# Patient Record
Sex: MALE | Race: WHITE | HISPANIC OR LATINO | Employment: PART TIME | URBAN - METROPOLITAN AREA
[De-identification: names, ages, dates, MRNs, and addresses within clinical notes are randomized per-mention and may not be internally consistent; named-entity substitution may affect disease eponyms.]

---

## 2019-06-22 ENCOUNTER — HOSPITAL ENCOUNTER (EMERGENCY)
Facility: HOSPITAL | Age: 44
Discharge: HOME | End: 2019-06-23
Attending: FAMILY MEDICINE

## 2019-06-22 VITALS
RESPIRATION RATE: 20 BRPM | BODY MASS INDEX: 31.6 KG/M2 | DIASTOLIC BLOOD PRESSURE: 77 MMHG | HEART RATE: 62 BPM | TEMPERATURE: 99 F | WEIGHT: 171.75 LBS | OXYGEN SATURATION: 98 % | SYSTOLIC BLOOD PRESSURE: 143 MMHG | HEIGHT: 62 IN

## 2019-06-22 DIAGNOSIS — S61.327A: ICD-10-CM

## 2019-06-22 DIAGNOSIS — R03.0 ELEVATED BLOOD PRESSURE READING WITHOUT DIAGNOSIS OF HYPERTENSION: Primary | ICD-10-CM

## 2019-06-22 PROCEDURE — 96372 THER/PROPH/DIAG INJ SC/IM: CPT

## 2019-06-22 PROCEDURE — 11760 REPAIR OF NAIL BED: CPT

## 2019-06-22 PROCEDURE — 63600175 PHARM REV CODE 636 W HCPCS: Performed by: NURSE PRACTITIONER

## 2019-06-22 PROCEDURE — 90715 TDAP VACCINE 7 YRS/> IM: CPT | Performed by: NURSE PRACTITIONER

## 2019-06-22 PROCEDURE — 99284 EMERGENCY DEPT VISIT MOD MDM: CPT | Mod: 25

## 2019-06-22 PROCEDURE — 12001 RPR S/N/AX/GEN/TRNK 2.5CM/<: CPT

## 2019-06-22 PROCEDURE — 90471 IMMUNIZATION ADMIN: CPT | Performed by: NURSE PRACTITIONER

## 2019-06-22 PROCEDURE — 25000003 PHARM REV CODE 250: Performed by: NURSE PRACTITIONER

## 2019-06-22 PROCEDURE — 12041 INTMD RPR N-HF/GENIT 2.5CM/<: CPT

## 2019-06-22 RX ORDER — LIDOCAINE HYDROCHLORIDE 10 MG/ML
10 INJECTION, SOLUTION EPIDURAL; INFILTRATION; INTRACAUDAL; PERINEURAL ONCE
Status: COMPLETED | OUTPATIENT
Start: 2019-06-22 | End: 2019-06-22

## 2019-06-22 RX ORDER — CEFAZOLIN SODIUM 1 G/3ML
1 INJECTION, POWDER, FOR SOLUTION INTRAMUSCULAR; INTRAVENOUS
Status: COMPLETED | OUTPATIENT
Start: 2019-06-22 | End: 2019-06-22

## 2019-06-22 RX ORDER — CIPROFLOXACIN 500 MG/1
500 TABLET ORAL 2 TIMES DAILY
Qty: 28 TABLET | Refills: 0 | Status: SHIPPED | OUTPATIENT
Start: 2019-06-22 | End: 2019-07-06

## 2019-06-22 RX ORDER — HYDROCODONE BITARTRATE AND ACETAMINOPHEN 7.5; 325 MG/1; MG/1
1 TABLET ORAL EVERY 4 HOURS PRN
Qty: 18 TABLET | Refills: 0 | Status: SHIPPED | OUTPATIENT
Start: 2019-06-22

## 2019-06-22 RX ORDER — HYDROCODONE BITARTRATE AND ACETAMINOPHEN 7.5; 325 MG/1; MG/1
1 TABLET ORAL EVERY 6 HOURS PRN
Status: DISCONTINUED | OUTPATIENT
Start: 2019-06-22 | End: 2019-06-23 | Stop reason: HOSPADM

## 2019-06-22 RX ADMIN — HYDROCODONE BITARTRATE AND ACETAMINOPHEN 1 TABLET: 7.5; 325 TABLET ORAL at 10:06

## 2019-06-22 RX ADMIN — LIDOCAINE HYDROCHLORIDE 100 MG: 10 INJECTION, SOLUTION EPIDURAL; INFILTRATION; INTRACAUDAL; PERINEURAL at 09:06

## 2019-06-22 RX ADMIN — CEFAZOLIN 1 G: 1 INJECTION, POWDER, FOR SOLUTION INTRAMUSCULAR; INTRAVENOUS at 09:06

## 2019-06-22 RX ADMIN — BACITRACIN, NEOMYCIN, POLYMYXIN B 1 EACH: 400; 3.5; 5 OINTMENT TOPICAL at 11:06

## 2019-06-22 RX ADMIN — CLOSTRIDIUM TETANI TOXOID ANTIGEN (FORMALDEHYDE INACTIVATED), CORYNEBACTERIUM DIPHTHERIAE TOXOID ANTIGEN (FORMALDEHYDE INACTIVATED), BORDETELLA PERTUSSIS TOXOID ANTIGEN (GLUTARALDEHYDE INACTIVATED), BORDETELLA PERTUSSIS FILAMENTOUS HEMAGGLUTININ ANTIGEN (FORMALDEHYDE INACTIVATED), BORDETELLA PERTUSSIS PERTACTIN ANTIGEN, AND BORDETELLA PERTUSSIS FIMBRIAE 2/3 ANTIGEN 0.5 ML: 5; 2; 2.5; 5; 3; 5 INJECTION, SUSPENSION INTRAMUSCULAR at 09:06

## 2019-06-23 NOTE — ED NOTES
Bed: 22  Expected date:   Expected time:   Means of arrival:   Comments:  Max, marcellino, finger laceration     Fozia Castillo, LEANNE  06/22/19 2678

## 2019-06-23 NOTE — ED PROVIDER NOTES
SCRIBE #1 NOTE: I, Ann Zaldivar, am scribing for, and in the presence of, Fozia Castillo NP. I have scribed the entire note.       History     Chief Complaint   Patient presents with    Finger Injury     laceration noted to left pinky finger. pt. hit finger with shovel yesterday 6/21/2019 at 10am at his home     Review of patient's allergies indicates:  No Known Allergies      History of Present Illness     HPI    6/22/2019, 8:55 PM  History obtained from the patient, pt's friend, and via [facility]  (code: [id number])      History of Present Illness: Wilmer Rosenbaum is a 44 y.o. male patient with no significant PMHx who presents to the Emergency Department for evaluation of a laceration to the 5th digit of the L hand which onset suddenly yesterday morning when the pt hit his finger against a shovel blade. Pt states that he is not UTD on his tetanus vaccination. Pt's BP was also noted to be elevated upon arrival at the ED. Pt reports no hx of HTN. Symptoms are constant and moderate in severity. Movement increases pain. No migrating factors reported. Associated sxs include L pinky pain. Patient denies any fever/ chills, numbness, tingling, back/ neck pain, joint swelling, other injuries, HA, dizziness, lightheadedness, CP, SOB, cough, congestion, abdominal pain, dysuria, n/v/d, and all other sxs at this time. Prior Tx includes washing the wound. No further complaints or concerns at this time.     Arrival mode: Personal vehicle      PCP: Primary Doctor No     Past Medical History:  History reviewed. No pertinent medical history.    Past Surgical History:  History reviewed. No pertinent surgical history.    Family History:  History reviewed. No pertinent family history.    Social History:  Social History Main Topics    Smoking status: Unknown if ever smoked    Smokeless tobacco: Unknown if ever used    Alcohol Use: Unknown drinking history    Drug Use: Unknown if ever used    Sexual Activity:  Unknown      Review of Systems     Review of Systems   Constitutional: Negative for chills and fever.   HENT: Negative for congestion, rhinorrhea, sore throat and trouble swallowing.    Respiratory: Negative for cough and shortness of breath.    Cardiovascular: Negative for chest pain and palpitations.   Gastrointestinal: Negative for abdominal pain, diarrhea, nausea and vomiting.   Genitourinary: Negative for dysuria and hematuria.   Musculoskeletal: Positive for arthralgias (L hand's 5th digit). Negative for back pain, joint swelling and neck pain.   Skin: Positive for wound (laceration to the L hand's 5th digit). Negative for rash.   Neurological: Negative for dizziness, weakness, light-headedness, numbness and headaches.   Hematological: Does not bruise/bleed easily.   All other systems reviewed and are negative.     Physical Exam     Initial Vitals [06/22/19 1958]   BP Pulse Resp Temp SpO2   (!) 143/77 62 20 98.6 °F (37 °C) 98 %      MAP       --          Physical Exam  Nursing Notes and Vital Signs Reviewed.  Constitutional: Patient is in no acute distress. Well-developed and well-nourished.  Head: Atraumatic. Normocephalic.  Eyes: PERRL. EOM intact. Conjunctivae are not pale. No scleral icterus.  ENT: Mucous membranes are moist. Oropharynx is clear and symmetric.    Neck: Supple. Full ROM. No lymphadenopathy.  Cardiovascular: Regular rate. Regular rhythm. No murmurs, rubs, or gallops. Distal pulses are 2+ and symmetric.  Pulmonary/Chest: No respiratory distress. Clear to auscultation bilaterally. No wheezing or rales.  Abdominal: Soft and non-distended.  There is no tenderness.  No rebound, guarding, or rigidity. Good bowel sounds.  Genitourinary: No CVA tenderness  Musculoskeletal: Moves all extremities. No obvious deformities. No edema. No calf tenderness.  Skin: Warm and dry. 2 cm laceration across the top of the left 5th finger involving the complete surface of the nail. Laceration angled across the nail.          Left 5th finger    Left 5th finger    Left fifth finger      Left 5th finger post sutur    Left fifth finger post suture    Neurological:  Alert, awake, and appropriate.  Normal speech.  No acute focal neurological deficits are appreciated.  Psychiatric: Normal affect. Good eye contact. Appropriate in content.     ED Course   Lac Repair  Date/Time: 2019 10:20 PM  Performed by: Fozia Castillo NP  Authorized by: Fozia Castillo NP   Consent Done: Yes  Consent: Verbal consent obtained. Written consent obtained.  Risks and benefits: risks, benefits and alternatives were discussed  Consent given by: patient  Patient understanding: patient states understanding of the procedure being performed  Patient consent: the patient's understanding of the procedure matches consent given  Patient identity confirmed:  and verbally with patient  Body area: upper extremity  Location details: left small finger  Laceration length: 2 cm  Contamination: The wound is contaminated.  Foreign bodies: no foreign bodies  Tendon involvement: none  Nerve involvement: none  Vascular damage: no  Anesthesia: digital block    Anesthesia:  Local Anesthetic: lidocaine 1% without epinephrine  Anesthetic total: 6 mL  Patient sedated: no  Preparation: Patient was prepped and draped in the usual sterile fashion.  Irrigation solution: saline  Irrigation method: syringe  Amount of cleaning: extensive (260 ml sterile water after h2o2 cleansing, then betadine prep )  Debridement: none  Degree of undermining: none  Skin closure: Ethilon (4-0 ethilon)  Number of sutures: 6  Technique: complex (Interrupted, complex with suture of nail bed )  Approximation: loose  Approximation difficulty: complex  Dressing: 4x4 sterile gauze and splint for protection  Patient tolerance: Patient tolerated the procedure well with no immediate complications  Comments: Triple antibiotic to wound edges. Xray done post procedure evaluate approximation of fracture.  "        ED Vital Signs:  Vitals:    06/22/19 1958   BP: (!) 143/77   Pulse: 62   Resp: 20   Temp: 98.6 °F (37 °C)   TempSrc: Oral   SpO2: 98%   Weight: 77.9 kg (171 lb 11.8 oz)   Height: 5' 2" (1.575 m)       Abnormal Lab Results:  Labs Reviewed - No data to display     All Lab Results:  Na    Imaging Results:  Imaging Results          X-Ray Finger 2 or More Views Left (Final result)  Result time 06/22/19 23:36:11    Final result by Chi Lord III, MD (06/22/19 23:36:11)                 Impression:      See above      Electronically signed by: Chi Lord MD  Date:    06/22/2019  Time:    23:36             Narrative:    EXAMINATION:  XR FINGER 2 OR MORE VIEWS LEFT    CLINICAL HISTORY:  post suture evaluate approximation of fracture left pinky (5th finger);    FINDINGS:  Laceration or partial soft tissue amputation injury involving the distal tip of the 5th finger appears improved status post suturing.  No evidence of residual foreign body.  There is improved alignment the fracture of the 5th distal phalanx tuft with minimal residual anterior medial displacement.  No other fracture is identified.  Joint alignment is anatomic.                               X-Ray Finger 2 or More Views Left (Final result)  Result time 06/22/19 22:14:18    Final result by Chi Lord III, MD (06/22/19 22:14:18)                 Impression:      See above      Electronically signed by: Chi Lord MD  Date:    06/22/2019  Time:    22:14             Narrative:    EXAMINATION:  XR FINGER 2 OR MORE VIEWS LEFT    CLINICAL HISTORY:  trauma left pinky (left 5th finger);    FINDINGS:  There is a soft tissue defect involving the distal tip of the 5th finger consistent with partial amputation injury.  There are punctate hyperdense adjacent foreign bodies along the skin surface and/or subcutaneous tissues of the distal 5th finger.  There is an adjacent oblique, possibly open fracture of the 5th distal phalanx tuft with mild medial " displacement.  No other fracture identified.  Joint alignment is anatomic.                                      The Emergency Provider reviewed the vital signs and test results, which are outlined above.     ED Discussion     9:14 PM: Discussed pt's case with Dr. Martinez (Emergency Medicine) who recommends suturing the L 5th finger laceration even though the laceration occurred yesterday morning around 10 AM.    9:46 PM: Updated Dr. Martinez on the pt's condition, including fracture of the L hand's 5th digit. Sent a secure message to Dr. Taylor Lancaster (Orthopedic). Awaiting for recommendations.      9:54 PM: Discussed pt's case with Dr. Taylor Lancaster (Orthopedic) who recommends thorough irrigation of the wound and repairing the laceration. Dr. Lancaster states that the fracture will likely nearly or fully reduce. Dr. Lancaster advises a full course of antibiotics (Cipro 500 mg 1BID x14) and splinting the finger.    11:36 PM: Discussed with pt all pertinent ED information and results. Discussed pt dx of cxr and plan of tx. Gave pt all f/u and return to the ED instructions. All questions and concerns were addressed at this time. Pt expresses understanding of information and instructions, and is comfortable with plan to discharge. Pt is stable for discharge.    I discussed wound care precautions with patient and/or family/caretaker; specifically that all wounds have risk of infection despite efforts to cleanse and debride the wound; and there is a risk of an occult foreign body (and thus increased risk of infection) despite a negative examination.  I discussed with patient need to return for any signs of infection, specifically redness, increased pain, fever, drainage of pus, or any concern, immediately.    I discussed with patient and/or family/caretaker that evaluation in the ED does not suggest any emergent or life threatening medical conditions requiring immediate intervention beyond what was provided in the ED, and I  believe patient is safe for discharge.  Regardless, an unremarkable evaluation in the ED does not preclude the development or presence of a serious of life threatening condition. As such, patient was instructed to return immediately for any worsening or change in current symptoms.    ED Medication(s):  Medications   Tdap vaccine injection 0.5 mL (0.5 mLs Intramuscular Given 6/22/19 2137)   ceFAZolin injection 1 g (1 g Intramuscular Given 6/22/19 2137)   lidocaine (PF) 10 mg/ml (1%) injection 100 mg (100 mg Other Given by Other 6/22/19 2136)   neomycin-bacitracnZn-polymyxnB packet 1 each (1 each Topical (Top) Given 6/22/19 2348)       Discharge Medication List as of 6/22/2019 11:16 PM      START taking these medications    Details   ciprofloxacin HCl (CIPRO) 500 MG tablet Take 1 tablet (500 mg total) by mouth 2 (two) times daily. for 14 days, Starting Sat 6/22/2019, Until Sat 7/6/2019, Print      HYDROcodone-acetaminophen (NORCO) 7.5-325 mg per tablet Take 1 tablet by mouth every 4 (four) hours as needed for Pain., Starting Sat 6/22/2019, Print             Follow-up Information     Schedule an appointment as soon as possible for a visit  with Taylor Lancaster MD.    Specialty:  Orthopedic Surgery  Why:  llame a yareli 6/24/2019 para hacer nieves gustavo con el Dr. Lancaster dentro de los próximos 3-5 días  Contact information:  [address] [place] [address] DR Henry STEVEN [id number] [phone]                         Medical Decision Making:   Clinical Tests:   Radiological Study: Ordered and Reviewed             Scribe Attestation:   Scribe #1: I performed the above scribed service and the documentation accurately describes the services I performed. I attest to the accuracy of the note.     Attending:   Physician Attestation Statement for Scribe #1: I, Fozia Castillo NP, personally performed the services described in this documentation, as scribed by Ann Zaldivar, in my presence, and it is both accurate and complete.            Clinical Impression       ICD-10-CM ICD-9-CM   1. Elevated blood pressure reading without diagnosis of hypertension R03.0 796.2   2. Laceration of left little finger with foreign body and damage to nail, initial encounter S61.327A 883.1     Elevated blood pressure reading without diagnosis of hypertension    Laceration of left little finger with foreign body and damage to nail, initial encounter    Other orders  -     Cancel: X-Ray Finger 2 or More Views Left; Standing  -     X-Ray Finger 2 or More Views Left; Standing  -     Tdap vaccine injection 0.5 mL  -     Cancel: Clean wound(s); Standing  -     LACERATION REPAIR; Standing  -     Cancel: Clean wound(s); Standing  -     Provide suture tray to patient bedside; Standing  -     ceFAZolin injection 1 g  -     lidocaine (PF) 10 mg/ml (1%) injection 100 mg  -     Discontinue: HYDROcodone-acetaminophen 7.5-325 mg per tablet 1 tablet  -     ciprofloxacin HCl (CIPRO) 500 MG tablet; Take 1 tablet (500 mg total) by mouth 2 (two) times daily. for 14 days  Dispense: 28 tablet; Refill: 0  -     HYDROcodone-acetaminophen (NORCO) 7.5-325 mg per tablet; Take 1 tablet by mouth every 4 (four) hours as needed for Pain.  Dispense: 18 tablet; Refill: 0  -     X-Ray Finger 2 or More Views Left; Standing  -     Application finger splint static; Standing  -     neomycin-bacitracnZn-polymyxnB packet 1 each        Disposition:   Disposition: Discharged  Condition: Stable         Fozia Castillo NP  06/24/19 0928